# Patient Record
(demographics unavailable — no encounter records)

---

## 2025-05-07 NOTE — HISTORY OF PRESENT ILLNESS
[FreeTextEntry1] : David is a 61 y/o F who is here for new dx of Right ADH and ALH. Pt has hx of LCIS.  Pt denies feeling any palpable masses.   Her imaging is as follows: 6/21/24 b/l breast MRI Signal void artifact in central right breast at the site of prior biopsy demonstrating LCIS. Surgical consultation for further management is advised.  03/18/25 b/l dx mammo and us - birads4 -There are scattered areas of fibroglandular density.  -In the right breast at approximately 9:00 location there is a biopsy marker clip corresponding to a previously biopsied areas with pathology yielding lobular carcinoma in situ. Surgical consultation is recommended.  -in the right breast at 7:00 location there are grouped calcifications spanning approximately 3 mm. Stereotactic core biopsy is recommended for further evaluation.  Biopsy of the right breast is recommended.   04/21/25 right stereo bx A. Breast. right, Jar 1, 7 o'clock, Calcs, stereotactic biopsy:  (infinity clip) -*Focal atypical ductal hyperplasia* -Atypical lobular hyperplasia*, focal. -Columnar cell change, associated with microcalcifications. B. Breast, right, Jar 2, 7 o'clock, tissue, stereotactic biopsy: -benign breast tissue. -Microcalcifications in benign ductules.  The pathology results are concordant with the imaging findings.    HISTORICAL RISK FACTORS FHx: No ovarian or breast CA Reduction mammoplasty 2012 Menstrual Hx: 1 child at 29, menarche at 13-14, menopause with hysterectomy in 2008.

## 2025-05-07 NOTE — DATA REVIEWED
[FreeTextEntry1] : 03/18/25 b/l dx mammo and us - birads4 -There are scattered areas of fibroglandular density.  -In the right breast at approximately 9:00 location there is a biopsy marker clip corresponding to a previously biopsied areas with pathology yielding lobular carcinoma in situ. Surgical consultation is recommended.  -in the right breast at 7:00 location there are grouped calcifications spanning approximately 3 mm. Stereotactic core biopsy is recommended for further evaluation.  Biopsy of the right breast is recommended.   04/21/25 right stereo bx A. Breast. right, Jar 1, 7 o'clock, Calcs, stereotactic biopsy:  (infinity clip) -*Focal atypical ductal hyperplasia* -Atypical lobular hyperplasia*, focal. -Columnar cell change, associated with microcalcifications. B. Breast, right, Jar 2, 7 o'clock, tissue, stereotactic biopsy: -benign breast tissue. -Microcalcifications in benign ductules.  The pathology results are concordant with the imaging findings.

## 2025-05-07 NOTE — ASSESSMENT
[FreeTextEntry1] : David is a 61 y/o F who is here for new dx of Right ADH and ALH. On physical exam, there are no discrete masses in either breast or axilla. There is no nipple discharge or inversion bilaterally. There are no skin changes bilaterally. Her pathology results were reviewed. LCIS And ALH is considered to be a marker for an increased risk of breast cancer. Women with LCIS and ALH have higher risk for developing invasive cancer in either breast. ADH is different than LCIS and ALH. We discussed atypical ductal hyperplasia. These are high-risk lesions and can be associated with increased risk of breast cancer. The recommendation is for surgical excision or lumpectomy.  Since this is not readily palpable, it will need to be localized preoperatively with a smart clip placement before the day of her surgery. She agrees to lumpectomy with TAG localization. The benefits and risks of the lumpectomy procedure were explained to the patient, including but not limited to bleeding, infection, seroma and/or hematoma formation, pain, numbness of skin, scarring. She is aware that she will meet with the pre-surgical department here at the hospital for pre-surgery testing. She is also aware that she will likely need to meet with her primary care physician, and/or other medical specialists to obtain clearance for surgery, and to contact them appropriately. total time on encounter : 48 mins PLAN: -Right lumpectomy with TAG

## 2025-05-07 NOTE — ADDENDUM
[FreeTextEntry1] :  Documented by Phoenix Prince acting as a scribe for Dr. JACOME [facility] on 05/07/2025.

## 2025-05-07 NOTE — END OF VISIT
[FreeTextEntry3] : All medical record entries made by the padmini were at my, ANGELINA Bragg, direction and personally dictated by me on 05/07/2025. I have reviewed the chart and agreed that the record accurately reflects my personal performance of the history, physical examination, assessment and plan. I have also personally directed, reviewed and agreed with the chart.

## 2025-06-25 NOTE — ASSESSMENT
[FreeTextEntry1] : Susana is a 61 y/o F who is here POST OP s/p Right lumpectomy for LCIS from 6/10/25. Pt is healing well. The pathology was discussed with the pt. No residue of ADH. Since pt has LCIS, we will be incorporating B/l breast MRI and mammogram in an alternative fashion every 6 months. Consideration of chemo prevention by using tamoxifen was also explained. If interested pt will be referred to [facility]. Pt wants to monitor it.  PLAN: -Breast MRI in 6 months  -F/u after imaging with the PA

## 2025-06-25 NOTE — HISTORY OF PRESENT ILLNESS
[FreeTextEntry1] : Susana is a 63 y/o F who is here POST OP s/p Right lumpectomy for LCIS from 6/10/25. Pt states of healing well.   Her imaging is as follows: 6/21/24 b/l breast MRI Signal void artifact in central right breast at the site of prior biopsy demonstrating LCIS. Surgical consultation for further management is advised.  03/18/25 b/l dx mammo and us - birads4 -There are scattered areas of fibroglandular density. -In the right breast at approximately 9:00 location there is a biopsy marker clip corresponding to a previously biopsied areas with pathology yielding lobular carcinoma in situ. Surgical consultation is recommended. -in the right breast at 7:00 location there are grouped calcifications spanning approximately 3 mm. Stereotactic core biopsy is recommended for further evaluation. Biopsy of the right breast is recommended.  04/21/25 right stereo bx A. Breast. right, Jar 1, 7 o'clock, Calcs, stereotactic biopsy: (infinity clip) -*Focal atypical ductal hyperplasia* -Atypical lobular hyperplasia*, focal. -Columnar cell change, associated with microcalcifications. B. Breast, right, Jar 2, 7 o'clock, tissue, stereotactic biopsy: -benign breast tissue. -Microcalcifications in benign ductules. The pathology results are concordant with the imaging findings.  05/23/2025 Tissue Bx One (1) outside slide for review from [facility] labeled "[id number]", clinically "right breast central calcifications", stereotactic guided vacuum-assisted needle core biopsies: - Lobular carcinoma in situ (LCIS), classical type, and atypical lobular hyperplasia (ALH). - Atrophic breast tissue with stromal fibroelastosis, apocrine metaplasia-lined cysts, and proliferative type fibrocystic changes associated with phosphate microcalcifications present in small ductules.  One (1) outside slide for review from [facility] labeled "[id number]", clinically "right breast central tissue", stereotactic guided vacuum-assisted needle core biopsies: - Benign atrophic fatty breast tissue with stromal fibroelastosis and proliferative type fibrocystic changes.  Four (4) outside slides for review from [facility] labeled "[id number]", clinically "right breast calcifications", stereotactic guided vacuum-assisted needle core biopsies: - Single microscopic focus of atypical ductal hyperplasia (ADH), cribriform type. - Atypical lobular hyperplasia (ALH). - Proliferative type fibrocystic changes including usual duct hyperplasia (UDH) and nodular sclerosing adenosis. - Microscopic foci of fat necrosis.  One (1) outside slide for review from [facility] labeled "[id number]", clinically "right breast tissue", stereotactic guided vacuum-assisted needle core biopsies: - Benign atrophic fatty breast tissue with focal stromal fibroelastosis.  06/10/2025 Pathology Breast, right, lumpectomy: - Lobular carcinoma in situ (LCIS), classical type. - No residual ADH is identified. - Atrophic breast tissue with stromal fibroelastosis associated with phosphate microcalcifications present in small ductules. - Prior biopsy site changes x2.   HISTORICAL RISK FACTORS FHx: No ovarian or breast CA Reduction mammoplasty 2012 Menstrual Hx: 1 child at 29, menarche at 13-14, menopause with hysterectomy in 2008.

## 2025-06-25 NOTE — DATA REVIEWED
[FreeTextEntry1] : Patient:     ANTHONY DEVI   Accession:                             [id number]  Collected Date/Time:                   5/23/2025 09:27 EDT Received Date/Time:                    5/23/2025 09:27 EDT  Surgical Pathology Consultation Report - Auth (Verified)  Specimen(s) Submitted Submitted slide pathology  Final Diagnosis One (1) outside slide for review from [facility] labeled "[id number]", clinically "right breast central calcifications", stereotactic guided vacuum-assisted needle core biopsies: - Lobular carcinoma in situ (LCIS), classical type, and atypical lobular  hyperplasia (ALH). - Atrophic breast tissue with stromal fibroelastosis, apocrine metaplasia-lined cysts, and proliferative type fibrocystic changes associated with phosphate microcalcifications present in small ductules.  One (1) outside slide for review from [facility] labeled "[id number]", clinically "right breast central tissue", stereotactic guided vacuum-assisted needle core biopsies: - Benign atrophic fatty breast tissue with stromal fibroelastosis and proliferative type fibrocystic changes.  Four (4) outside slides for review from [facility] labeled "[id number]", clinically "right breast calcifications", stereotactic guided vacuum-assisted needle core biopsies: - Single microscopic focus of atypical ductal hyperplasia (ADH), cribriform type. - Atypical lobular hyperplasia (ALH). - Proliferative type fibrocystic changes including usual duct hyperplasia (UDH) and nodular sclerosing adenosis. - Microscopic foci of fat necrosis.  One (1) outside slide for review from [facility] labeled "[id number]", clinically "right breast tissue", stereotactic guided vacuum-assisted needle core biopsies: - Benign atrophic fatty breast tissue with focal stromal fibroelastosis.    	 [facility] Accession Number : [id number] Patient:     ANTHONY DEVI   Accession:                             [id number]  Collected Date/Time:                   6/10/2025 10:28 EDT Received Date/Time:                    6/10/2025 13:00 EDT  Surgical Pathology Report - Auth (Verified)  Specimen(s) Submitted Right breast lumpectomy Time obtained: 12:28 pm Cold ischemic time <1 hour  Final Diagnosis Breast, right, lumpectomy: - Lobular carcinoma in situ (LCIS), classical type.  - No residual ADH is identified. - Atrophic breast tissue with stromal fibroelastosis associated with phosphate microcalcifications present in small ductules. - Prior biopsy site changes x2. Verified by: Manuela Jennings M.D. (Electronic Signature) Reported on: 06/16/25 10:33 EDT, [facility], [address] [place] [address] Phone: [phone]   Fax: [phone] _________________________________________________________________   Clinical Information Right lumpectomy  Perioperative Diagnosis ADH  Postoperative Diagnosis ADH   Gross Description The specimen is received fresh, labeled "right breast lumpectomy" and consists of a breast lumpectomy specimen weighing 45 g and measuring 7.5 cm from superior to inferior, 6.5 cm by medial to lateral and 1.5 by anterior to posterior. The specimen is oriented by sutures as follows: Long suture lateral, short suture superior. The specimen is inked as follows: superior - blue, inferior - green, lateral - yellow, medial - orange, anterior - red, posterior - black. The specimen is serially sectioned from superior to inferior. The cut surface of the breast tissue appears homogenous yellow and lobulated with tan, white fibrocystic changes. Specimen is entirely submitted.  Summary of Sections: [id number] section of the superior margin-3 1D-1E-full cross-section-2 1F-1G-full cross-section-2 1H-1I-full cross-section-2 1J-1L-full cross-section 3 1M-1O-full cross-section-3 1P-1R-full cross-section-3 1S-1U-full cross-section-3 [id number]-[id number] section of the inferior margin-4  Total: 25 blocks

## 2025-06-25 NOTE — END OF VISIT
[FreeTextEntry3] : All medical record entries made by the padmini were at my, ANGELINA Bragg, direction and personally dictated by me on 06/25/2025. I have reviewed the chart and agreed that the record accurately reflects my personal performance of the history, physical examination, assessment and plan. I have also personally directed, reviewed and agreed with the chart.

## 2025-06-25 NOTE — DATA REVIEWED
[FreeTextEntry1] : Patient:     ANTHONY DEVI   Accession:                             [id number]  Collected Date/Time:                   5/23/2025 09:27 EDT Received Date/Time:                    5/23/2025 09:27 EDT  Surgical Pathology Consultation Report - Auth (Verified)  Specimen(s) Submitted Submitted slide pathology  Final Diagnosis One (1) outside slide for review from [facility] labeled "[id number]", clinically "right breast central calcifications", stereotactic guided vacuum-assisted needle core biopsies: - Lobular carcinoma in situ (LCIS), classical type, and atypical lobular  hyperplasia (ALH). - Atrophic breast tissue with stromal fibroelastosis, apocrine metaplasia-lined cysts, and proliferative type fibrocystic changes associated with phosphate microcalcifications present in small ductules.  One (1) outside slide for review from [facility] labeled "[id number]", clinically "right breast central tissue", stereotactic guided vacuum-assisted needle core biopsies: - Benign atrophic fatty breast tissue with stromal fibroelastosis and proliferative type fibrocystic changes.  Four (4) outside slides for review from [facility] labeled "[id number]", clinically "right breast calcifications", stereotactic guided vacuum-assisted needle core biopsies: - Single microscopic focus of atypical ductal hyperplasia (ADH), cribriform type. - Atypical lobular hyperplasia (ALH). - Proliferative type fibrocystic changes including usual duct hyperplasia (UDH) and nodular sclerosing adenosis. - Microscopic foci of fat necrosis.  One (1) outside slide for review from [facility] labeled "[id number]", clinically "right breast tissue", stereotactic guided vacuum-assisted needle core biopsies: - Benign atrophic fatty breast tissue with focal stromal fibroelastosis.    	 [facility] Accession Number : [id number] Patient:     ANTHONY DEVI   Accession:                             [id number]  Collected Date/Time:                   6/10/2025 10:28 EDT Received Date/Time:                    6/10/2025 13:00 EDT  Surgical Pathology Report - Auth (Verified)  Specimen(s) Submitted Right breast lumpectomy Time obtained: 12:28 pm Cold ischemic time <1 hour  Final Diagnosis Breast, right, lumpectomy: - Lobular carcinoma in situ (LCIS), classical type.  - No residual ADH is identified. - Atrophic breast tissue with stromal fibroelastosis associated with phosphate microcalcifications present in small ductules. - Prior biopsy site changes x2. Verified by: Manuela Jennings M.D. (Electronic Signature) Reported on: 06/16/25 10:33 EDT, [facility], [address] [place] [address] Phone: [phone]   Fax: [phone] _________________________________________________________________   Clinical Information Right lumpectomy  Perioperative Diagnosis ADH  Postoperative Diagnosis ADH   Gross Description The specimen is received fresh, labeled "right breast lumpectomy" and consists of a breast lumpectomy specimen weighing 45 g and measuring 7.5 cm from superior to inferior, 6.5 cm by medial to lateral and 1.5 by anterior to posterior. The specimen is oriented by sutures as follows: Long suture lateral, short suture superior. The specimen is inked as follows: superior - blue, inferior - green, lateral - yellow, medial - orange, anterior - red, posterior - black. The specimen is serially sectioned from superior to inferior. The cut surface of the breast tissue appears homogenous yellow and lobulated with tan, white fibrocystic changes. Specimen is entirely submitted.  Summary of Sections: [id number]-9Q-[id number] section of the superior margin-3 1D-1E-full cross-section-2 1F-1G-full cross-section-2 1H-1I-full cross-section-2 1J-1L-full cross-section 3 1M-1O-full cross-section-3 1P-1R-full cross-section-3 1S-1U-full cross-section-3 3F-7H-finvrpdehxifp section of the inferior margin-4  Total: 25 blocks

## 2025-06-25 NOTE — HISTORY OF PRESENT ILLNESS
[FreeTextEntry1] : Susana is a 61 y/o F who is here POST OP s/p Right lumpectomy for LCIS from 6/10/25. Pt states of healing well.   Her imaging is as follows: 6/21/24 b/l breast MRI Signal void artifact in central right breast at the site of prior biopsy demonstrating LCIS. Surgical consultation for further management is advised.  03/18/25 b/l dx mammo and us - birads4 -There are scattered areas of fibroglandular density. -In the right breast at approximately 9:00 location there is a biopsy marker clip corresponding to a previously biopsied areas with pathology yielding lobular carcinoma in situ. Surgical consultation is recommended. -in the right breast at 7:00 location there are grouped calcifications spanning approximately 3 mm. Stereotactic core biopsy is recommended for further evaluation. Biopsy of the right breast is recommended.  04/21/25 right stereo bx A. Breast. right, Jar 1, 7 o'clock, Calcs, stereotactic biopsy: (infinity clip) -*Focal atypical ductal hyperplasia* -Atypical lobular hyperplasia*, focal. -Columnar cell change, associated with microcalcifications. B. Breast, right, Jar 2, 7 o'clock, tissue, stereotactic biopsy: -benign breast tissue. -Microcalcifications in benign ductules. The pathology results are concordant with the imaging findings.  05/23/2025 Tissue Bx One (1) outside slide for review from [facility] labeled "[id number]", clinically "right breast central calcifications", stereotactic guided vacuum-assisted needle core biopsies: - Lobular carcinoma in situ (LCIS), classical type, and atypical lobular hyperplasia (ALH). - Atrophic breast tissue with stromal fibroelastosis, apocrine metaplasia-lined cysts, and proliferative type fibrocystic changes associated with phosphate microcalcifications present in small ductules.  One (1) outside slide for review from [facility] labeled "[id number]", clinically "right breast central tissue", stereotactic guided vacuum-assisted needle core biopsies: - Benign atrophic fatty breast tissue with stromal fibroelastosis and proliferative type fibrocystic changes.  Four (4) outside slides for review from [facility] labeled "[id number]", clinically "right breast calcifications", stereotactic guided vacuum-assisted needle core biopsies: - Single microscopic focus of atypical ductal hyperplasia (ADH), cribriform type. - Atypical lobular hyperplasia (ALH). - Proliferative type fibrocystic changes including usual duct hyperplasia (UDH) and nodular sclerosing adenosis. - Microscopic foci of fat necrosis.  One (1) outside slide for review from [facility] labeled "[id number]", clinically "right breast tissue", stereotactic guided vacuum-assisted needle core biopsies: - Benign atrophic fatty breast tissue with focal stromal fibroelastosis.  06/10/2025 Pathology Breast, right, lumpectomy: - Lobular carcinoma in situ (LCIS), classical type. - No residual ADH is identified. - Atrophic breast tissue with stromal fibroelastosis associated with phosphate microcalcifications present in small ductules. - Prior biopsy site changes x2.   HISTORICAL RISK FACTORS FHx: No ovarian or breast CA Reduction mammoplasty 2012 Menstrual Hx: 1 child at 29, menarche at 13-14, menopause with hysterectomy in 2008.

## 2025-06-25 NOTE — ADDENDUM
[FreeTextEntry1] :  Documented by Phoenix Prince acting as a scribe for Dr. JACOME ANGELINA on 06/25/2025.

## 2025-06-25 NOTE — ASSESSMENT
[FreeTextEntry1] : Susana is a 63 y/o F who is here POST OP s/p Right lumpectomy for LCIS from 6/10/25. Pt is healing well. The pathology was discussed with the pt. No residue of ADH. Since pt has LCIS, we will be incorporating B/l breast MRI and mammogram in an alternative fashion every 6 months. Consideration of chemo prevention by using tamoxifen was also explained. If interested pt will be referred to [facility]. Pt wants to monitor it.  PLAN: -Breast MRI in 6 months  -F/u after imaging with the PA